# Patient Record
Sex: MALE | Race: WHITE | NOT HISPANIC OR LATINO | Employment: FULL TIME | URBAN - METROPOLITAN AREA
[De-identification: names, ages, dates, MRNs, and addresses within clinical notes are randomized per-mention and may not be internally consistent; named-entity substitution may affect disease eponyms.]

---

## 2017-01-27 ENCOUNTER — ALLSCRIPTS OFFICE VISIT (OUTPATIENT)
Dept: OTHER | Facility: OTHER | Age: 47
End: 2017-01-27

## 2017-08-08 ENCOUNTER — ALLSCRIPTS OFFICE VISIT (OUTPATIENT)
Dept: OTHER | Facility: OTHER | Age: 47
End: 2017-08-08

## 2017-12-19 ENCOUNTER — ALLSCRIPTS OFFICE VISIT (OUTPATIENT)
Dept: OTHER | Facility: OTHER | Age: 47
End: 2017-12-19

## 2018-01-14 NOTE — PSYCH
Psych Med Mgmt    Appearance: was calm and cooperative, adequate hygiene and grooming and good eye contact  Observed mood: anxious  Observed mood: affect appropriate  Speech: a normal rate and fluent  Thought processes: coherent/organized  Hallucinations: no hallucinations present  Thought Content: no delusions  Abnormal Thoughts: The patient has no suicidal thoughts and no homicidal thoughts  Orientation: The patient is oriented to person, place and time, oriented to person, oriented to place and oriented to time  Recent and Remote Memory: short term memory intact and long term memory intact  Attention Span And Concentration: concentration intact  Insight: Limited insight  Judgment: His judgment was limited  Muscle Strength And Tone  Muscle strength and tone were normal    The patient is experiencing no localized pain  Goals addressed in session: Medication management       Treatment Recommendations: Patient feels Sertraline has been causing weight gain and he is s/p bariatric surgery  Risks, Benefits And Possible Side Effects Of Medications: Risks, benefits, and possible side effects of medications explained to patient and patient verbalizes understanding  Discussed with patient Black Box warning on concurrent use of benzodiazepines and opioid medications including sedation, respiratory depression, coma and death  Patient understands the risk of treatment with benzodiazepines in addition to opioids and wants to continue taking those medications  He reports increased appetite, normal energy level, recent 25 lbs weight gain  and normal number of sleep hours  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  He stated he feels fine except for his concerns on weight gain that did not improved by lowering the dose of sertraline  Will try venlafaxine and cross taper  Assessment    1   Severe episode of recurrent major depressive disorder, without psychotic features   (296 33) (F33 2)   2  EVITA (generalized anxiety disorder) (300 02) (F41 1)    Plan    1  Sertraline HCl - 100 MG Oral Tablet (Zoloft); TAKE 1 TABLET DAILY    2  Venlafaxine HCl - 75 MG Oral Tablet; take 2 tablet twice daily   3  Venlafaxine HCl - 75 MG Oral Tablet; TAKE 1 TABLET TWICE DAILY    Review of Systems    Constitutional: as noted in HPI  Substance Abuse Hx    Substance Abuse History: Denies  Active Problems    1  Depression (311) (F32 9)   2  EVITA (generalized anxiety disorder) (300 02) (F41 1)   3  Severe episode of recurrent major depressive disorder, without psychotic features   (296 33) (F33 2)    Past Medical History    The active problems and past medical history were reviewed and updated today  Allergies    1  No Known Drug Allergies    Current Meds   1  Sertraline HCl - 100 MG Oral Tablet; TAKE 1 TABLET DAILY; Therapy: [id number] to [phone]  Requested for: 32KPZ6435; Last   Rx:[id number] Ordered    The medication list was reviewed and updated today  Family Psych History    The family history was reviewed and updated today  Social History    · Never A Smoker  The social history was reviewed and updated today  see HPI      End of Encounter Meds    1  Sertraline HCl - 100 MG Oral Tablet (Zoloft); TAKE 1 TABLET DAILY; Therapy: 68DPF7934 to (Ken Sanchez)  Requested for: 86Ulo2737; Last   Rx:[id number] Ordered    2  Venlafaxine HCl - 75 MG Oral Tablet; TAKE 1 TABLET TWICE DAILY; Therapy: 22Cts1670 to (Evaluate:36Ydu5363)  Requested for: 23Ama5231; Last   Rx:[id number] Ordered   3  Venlafaxine HCl - 75 MG Oral Tablet; take 2 tablet twice daily;    Therapy: 74Qbf0708 to (Evaluate:76Bvg7743)  Requested for: 70Ztl1997; Last   Rx:[id number]; Status: ACTIVE - Transmit to [facility] Verification Ordered    Signatures   Electronically signed by : JOSE L Anthony ; Aug  8 2017  4:29PM EST                       (Author)

## 2018-01-16 NOTE — PSYCH
Psych Med Mgmt    Appearance: was calm and cooperative, adequate hygiene and grooming and good eye contact  Observed mood: anxious  Observed mood: affect appropriate  Speech: a normal rate and fluent  Thought processes: coherent/organized  Hallucinations: no hallucinations present  Thought Content: no delusions  Abnormal Thoughts: The patient has no suicidal thoughts and no homicidal thoughts  Orientation: The patient is oriented to person, place and time, oriented to person, oriented to place and oriented to time  Recent and Remote Memory: short term memory intact and long term memory intact  Attention Span And Concentration: concentration impaired  Insight: Limited insight  Judgment: His judgment was limited  Muscle Strength And Tone  Muscle strength and tone were normal         Goals addressed in session: Medication Management       Treatment Recommendations: Patient stated he lost health insurance and he had to stretch his medication supplies and he reduced the dose to 100 mg and he stated he noticed it was better for him because he feels less activated  Risks, Benefits And Possible Side Effects Of Medications: Risks, benefits, and possible side effects of medications explained to patient and patient verbalizes understanding  He reports normal appetite, normal energy level, no weight change and normal number of sleep hours  Patient lost insurance and took his medication dose down for a while and then eventually stopped taking his medication  He stated he wants to resume the Sertraline at a lower dose  He stated he is close to graduating  He denies recent health changes   No new medications  Assessment    1  EVITA (generalized anxiety disorder) (300 02) (F41 1)   2  Severe episode of recurrent major depressive disorder, without psychotic features   (296 33) (F33 2)    Plan    1   From  Sertraline HCl - 100 MG Oral Tablet TAKE 2 TABLETS DAILY To   Sertraline HCl - 100 MG Oral Tablet (Zoloft) TAKE 1 TABLET DAILY    Review of Systems    Constitutional: as noted in HPI  Substance Abuse Hx    Substance Abuse History: Denies  Active Problems    1  Depression (311) (F32 9)   2  EVITA (generalized anxiety disorder) (300 02) (F41 1)   3  Severe episode of recurrent major depressive disorder, without psychotic features   (296 33) (F33 2)    Past Medical History    The active problems and past medical history were reviewed and updated today  Allergies    1  No Known Drug Allergies    Current Meds   1  Sertraline HCl - 100 MG Oral Tablet; TAKE 2 TABLETS DAILY; Therapy: 61WDC8195 to (Rommel Kay)  Requested for: 67GYG3286; Last   Rx:[id number] Ordered    The medication list was reviewed and updated today  Family Psych History    The family history was reviewed and updated today  Social History    · Never A Smoker  The social history was reviewed and updated today  See HPI      End of Encounter Meds    1  Sertraline HCl - 100 MG Oral Tablet (Zoloft); TAKE 1 TABLET DAILY;    Therapy: 87VRF0723 to (Whit Novoa)  Requested for: 05JYV1620; Last   LM:[id number] Ordered    Signatures   Electronically signed by : JOSE L Duarte ; Jan 27 2017  2:56PM EST                       (Author)

## 2018-01-23 NOTE — PSYCH
Psych Med Mgmt    Appearance: was calm and cooperative, adequate hygiene and grooming and good eye contact  Observed mood: mood appropriate  Observed mood: affect appropriate  Speech: a normal rate and fluent  Thought processes: coherent/organized  Hallucinations: no hallucinations present  Thought Content: no delusions  Abnormal Thoughts: The patient has no suicidal thoughts and no homicidal thoughts  Orientation: The patient is oriented to person, place and time, oriented to person, oriented to place and oriented to time  Recent and Remote Memory: short term memory intact and long term memory intact  Attention Span And Concentration: concentration intact  Insight: Limited insight  Judgment: His judgment was limited  Muscle Strength And Tone  Muscle strength and tone were normal         Goals addressed in session: Medication Management       Treatment Recommendations: Continue current treatment  Risks, Benefits And Possible Side Effects Of Medications: Risks, benefits, and possible side effects of medications explained to patient and patient verbalizes understanding  He reports normal appetite, normal energy level, no weight change and normal number of sleep hours  Mood has been less depressed and less anxious  he stated that 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 in nursing and 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 a nurse practitioner program  he stated that he had a small set back when he experienced a medication error  He has been more anxious at work after this  He stated he remains compliant with Sertraline and he was able to lose weight by adding more physical activity and he longer feels that Sertraline was causing it  MDD stable, no active symptoms of depression  EVITA: Increased due to starting new job  Assessment    1  EVITA (generalized anxiety disorder) (300 02) (F41 1)   2   Severe episode of recurrent major depressive disorder, without psychotic features   (296 33) (F33 2)    Plan    1  Sertraline HCl - 100 MG Oral Tablet (Zoloft); TAKE 1 TABLET DAILY    Review of Systems    Constitutional: No fever, no chills, feels well, no tiredness, no recent weight gain or loss  Cardiovascular: no complaints of slow or fast heart rate, no chest pain, no palpitations  Respiratory: no complaints of shortness of breath, no wheezing, no dyspnea on exertion  Gastrointestinal: no complaints of abdominal pain, no constipation, no nausea, no diarrhea, no vomiting  Genitourinary: no complaints of dysuria, no incontinence, no pelvic pain, no urinary frequency  Musculoskeletal: no complaints of arthralgia, no myalgias, no limb pain, no joint stiffness  Integumentary: no complaints of skin rash, no itching, no dry skin  Neurological: no complaints of headache, no confusion, no numbness, no dizziness  Substance Abuse Hx    Substance Abuse History: Denies  Active Problems    1  Depression (311) (F32 9)   2  EVITA (generalized anxiety disorder) (300 02) (F41 1)   3  Severe episode of recurrent major depressive disorder, without psychotic features   (296 33) (F33 2)    Past Medical History    The active problems and past medical history were reviewed and updated today  Allergies    1  No Known Drug Allergies    Current Meds   1  Sertraline HCl - 100 MG Oral Tablet; TAKE 1 TABLET DAILY; Therapy: 52DBQ3550 to (Evaluate:01Rfb2188)  Requested for: 25EQT7634; Last   Rx:[id number] Ordered    The medication list was reviewed and updated today  Family Psych History    The family history was reviewed and updated today  Social History    · Never A Smoker  The social history was reviewed and updated today  The social history was reviewed and is unchanged  End of Encounter Meds    1  Sertraline HCl - 100 MG Oral Tablet (Zoloft); TAKE 1 TABLET DAILY;    Therapy: 12EFF3028 to (Evaluate:94Ihe3476)  Requested for: 97ONJ0115; Last   Rx:[id number] Ordered    Signatures   Electronically signed by : JOSE L Quintana ; Dec 19 2017  9:48AM EST                       (Author)

## 2018-04-10 ENCOUNTER — TELEPHONE (OUTPATIENT)
Dept: PSYCHIATRY | Facility: CLINIC | Age: 48
End: 2018-04-10

## 2018-04-10 DIAGNOSIS — F33.2 MDD (MAJOR DEPRESSIVE DISORDER), RECURRENT SEVERE, WITHOUT PSYCHOSIS (HCC): Primary | ICD-10-CM

## 2018-04-10 RX ORDER — SERTRALINE HYDROCHLORIDE 100 MG/1
1 TABLET, FILM COATED ORAL DAILY
COMMUNITY
Start: 2013-03-19 | End: 2018-04-10 | Stop reason: SDUPTHER

## 2018-04-10 RX ORDER — SERTRALINE HYDROCHLORIDE 100 MG/1
100 TABLET, FILM COATED ORAL DAILY
Qty: 30 TABLET | Refills: 2 | Status: SHIPPED | OUTPATIENT
Start: 2018-04-10 | End: 2018-09-20 | Stop reason: SDUPTHER

## 2018-07-24 ENCOUNTER — DOCUMENTATION (OUTPATIENT)
Dept: PSYCHIATRY | Facility: CLINIC | Age: 48
End: 2018-07-24

## 2018-07-24 NOTE — PROGRESS NOTES
Treatment Plan not completed within required time limits due to: no show appointment on 7/18/2018      Also NS 9/28/2017  No Fu appointment scheduled at this time

## 2018-09-20 DIAGNOSIS — F33.2 MDD (MAJOR DEPRESSIVE DISORDER), RECURRENT SEVERE, WITHOUT PSYCHOSIS (HCC): ICD-10-CM

## 2018-09-20 RX ORDER — SERTRALINE HYDROCHLORIDE 100 MG/1
100 TABLET, FILM COATED ORAL DAILY
Qty: 30 TABLET | Refills: 2 | Status: SHIPPED | OUTPATIENT
Start: 2018-09-20

## 2020-03-03 ENCOUNTER — HOSPITAL ENCOUNTER (EMERGENCY)
Facility: HOSPITAL | Age: 50
Discharge: HOME | End: 2020-03-03
Attending: EMERGENCY MEDICINE | Admitting: EMERGENCY MEDICINE
Payer: COMMERCIAL

## 2020-03-03 VITALS
OXYGEN SATURATION: 98 % | WEIGHT: 184 LBS | RESPIRATION RATE: 18 BRPM | BODY MASS INDEX: 29.57 KG/M2 | HEART RATE: 76 BPM | TEMPERATURE: 98.8 F | SYSTOLIC BLOOD PRESSURE: 135 MMHG | HEIGHT: 66 IN | DIASTOLIC BLOOD PRESSURE: 86 MMHG

## 2020-03-03 DIAGNOSIS — B34.9 VIRAL ILLNESS: Primary | ICD-10-CM

## 2020-03-03 LAB
B PARAP IS1001 DNA NPH QL NAA+NON-PROBE: NOT DETECTED
B PERT.PT PRMT NPH QL NAA+NON-PROBE: NOT DETECTED
C PNEUM DNA NPH QL NAA+NON-PROBE: NOT DETECTED
FLUAV RNA NPH QL NAA+NON-PROBE: NOT DETECTED
FLUAV RNA NPH QL NAA+PROBE: NORMAL
FLUBV RNA NPH QL NAA+NON-PROBE: NOT DETECTED
FLUBV RNA NPH QL NAA+PROBE: NORMAL
HADV DNA NPH QL NAA+NON-PROBE: NOT DETECTED
HMPV RNA NPH QL NAA+NON-PROBE: NOT DETECTED
HPIV 1+2+3+4 RNA NPH QL NAA+NON-PROBE: NOT DETECTED
HPIV 1+2+3+4 RNA NPH QL NAA+NON-PROBE: NOT DETECTED
M PNEUMO DNA NPH QL NAA+NON-PROBE: NOT DETECTED
RSV RNA NPH QL NAA+NON-PROBE: NOT DETECTED
RSV RNA NPH QL NAA+PROBE: NORMAL
RV+EV RNA NPH QL NAA+NON-PROBE: NOT DETECTED

## 2020-03-03 PROCEDURE — 87486 CHLMYD PNEUM DNA AMP PROBE: CPT | Performed by: PHYSICIAN ASSISTANT

## 2020-03-03 PROCEDURE — 87798 DETECT AGENT NOS DNA AMP: CPT | Performed by: PHYSICIAN ASSISTANT

## 2020-03-03 PROCEDURE — 87633 RESP VIRUS 12-25 TARGETS: CPT | Performed by: PHYSICIAN ASSISTANT

## 2020-03-03 PROCEDURE — 99282 EMERGENCY DEPT VISIT SF MDM: CPT | Performed by: PHYSICIAN ASSISTANT

## 2020-03-03 PROCEDURE — 87581 M.PNEUMON DNA AMP PROBE: CPT | Performed by: PHYSICIAN ASSISTANT

## 2020-03-03 PROCEDURE — 99283 EMERGENCY DEPT VISIT LOW MDM: CPT

## 2020-03-03 NOTE — ED PROVIDER NOTES
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 [facility] [facility] [employer] in the [facility] [address] on January 31st   He states that 6 days ago he drove to the airport  He slept in his car prior to his flight - when he awoke in his car he began to notice runny nose and sinus congestion symptoms  He then proceeded to get on back to back flights to [place]  He states during that time his congestion and runny nose were getting more severe  He states 2 days ago he developed cough  He denies every having a fever  He denies headache, denies shortness of breath  Denies nausea or vomiting  Denies sore throat  Denies myalgias or arthralgias  Quality is reported as sinus congestion with runny nose  Severity is reported as mild to moderate  Modifiers:  Nothing has been tried for treatment of symptoms  Context: patient reports he is concerned about possible Coronavirus given his travel to [place] twice over the past 6 days in addition to his URI symptoms  He also reports that his sister is at home with a liver transplant and his elderly father lives at home and is also immunocompromised hence he is concerned because he cannot quarantine himself from them  He did receive the flu shot this year per his report  Reviewed past visits via [facility], no prior visits to this ed         History provided by:  Patient   used: No    Flu Symptoms   Presenting symptoms: cough and rhinorrhea    Presenting symptoms: no diarrhea, no fatigue, no fever, no headaches, no myalgias, no nausea, no shortness of breath, no sore throat and no vomiting    Associated symptoms: nasal congestion    Associated symptoms: no chills, no ear pain and no neck stiffness        Prior to Admission Medications   Prescriptions Last Dose Informant Patient Reported? Taking?   sertraline (ZOLOFT) 100 mg tablet   No No   Sig: TAKE 1 TABLET (100 MG TOTAL) BY MOUTH DAILY      Facility-Administered Medications: None       History reviewed  No pertinent past medical history  History reviewed  No pertinent surgical history  History reviewed  No pertinent family history  I have reviewed and agree with the history as documented  E-Cigarette/Vaping     E-Cigarette/Vaping Substances     Social History     Tobacco Use    Smoking status: Never Smoker    Smokeless tobacco: Never Used   Substance Use Topics    Alcohol use: Not Currently     Frequency: Never    Drug use: Never       Review of Systems   Constitutional: Negative for activity change, chills, diaphoresis, fatigue, fever and unexpected weight change  HENT: Positive for congestion, postnasal drip and rhinorrhea  Negative for dental problem, drooling, ear discharge, ear pain, facial swelling, hearing loss, mouth sores, nosebleeds, sinus pressure, sinus pain, sneezing, sore throat, tinnitus, trouble swallowing and voice change  Eyes: Negative for photophobia, pain, discharge, redness, itching and visual disturbance  Respiratory: Positive for cough  Negative for apnea, choking, chest tightness, shortness of breath, wheezing and stridor  Cardiovascular: Negative for chest pain, palpitations and leg swelling  Gastrointestinal: Negative for abdominal distention, abdominal pain, anal bleeding, blood in stool, constipation, diarrhea, nausea and vomiting  Endocrine: Negative for cold intolerance, heat intolerance, polydipsia, polyphagia and polyuria  Genitourinary: Negative for decreased urine volume, difficulty urinating, dysuria, flank pain, hematuria and urgency  Musculoskeletal: Negative for arthralgias, back pain, gait problem, joint swelling, myalgias, neck pain and neck stiffness  Skin: Negative for color change, pallor, rash and wound  Allergic/Immunologic: Negative for environmental allergies, food allergies and immunocompromised state  Neurological: Negative for dizziness, tremors, seizures, syncope, facial asymmetry, speech difficulty, weakness, light-headedness, numbness and headaches  Hematological: Negative for adenopathy  Does not bruise/bleed easily  Psychiatric/Behavioral: Negative for agitation, behavioral problems and confusion  The patient is not nervous/anxious  All other systems reviewed and are negative  Physical Exam  Physical Exam   Constitutional: He is oriented to person, place, and time  He appears well-developed and well-nourished  No distress  BP (!) 177/100 (BP Location: Right arm)   Pulse 76   Temp 98 5 °F (36 9 °C) (Oral)   Resp 18   Ht 5' 6" (1 676 m)   Wt 83 5 kg (184 lb)   SpO2 100%   BMI 29 70 kg/m²      HENT:   Head: Normocephalic and atraumatic  Mouth/Throat: No oropharyngeal exudate  There is yellow mucous drainage noted to the posterior pharynx  Uvula is midline without deviation  Tonsils without edema or purulent exudate  Nasal turbinates are injected and edematous bilaterally with mucous nasal drainage noted  Mucus membranes are moist and pink  Eyes: Pupils are equal, round, and reactive to light  Conjunctivae and EOM are normal  Right eye exhibits no discharge  Left eye exhibits no discharge  No scleral icterus  Neck: Normal range of motion  Neck supple  No JVD present  No tracheal deviation present  Cardiovascular: Normal rate and regular rhythm  Pulmonary/Chest: Effort normal  No stridor  No respiratory distress  He has no wheezes  He exhibits no tenderness  Breath sounds present bilaterally on auscultation to all lung fields  Scattered rhonchi bilaterally to the upper lung fields    No retractions or accessory muscle use  No wheezing  Abdominal: Soft  Bowel sounds are normal  He exhibits no distension and no mass  There is no tenderness  There is no rebound and no guarding  Musculoskeletal: Normal range of motion  He exhibits no edema, tenderness or deformity  Lymphadenopathy:     He has no cervical adenopathy  Neurological: He is alert and oriented to person, place, and time  He has normal reflexes  No cranial nerve deficit  He exhibits normal muscle tone  Coordination normal    Skin: Skin is warm and dry  Capillary refill takes less than 2 seconds  No rash noted  He is not diaphoretic  No erythema  No pallor  Psychiatric: He has a normal mood and affect  His behavior is normal  Judgment and thought content normal    Nursing note and vitals reviewed  Vital Signs  ED Triage Vitals [03/03/20 0914]   Temperature Pulse Respirations Blood Pressure SpO2   98 5 °F (36 9 °C) 76 18 (!) 177/100 100 %      Temp Source Heart Rate Source Patient Position - Orthostatic VS BP Location FiO2 (%)   Oral Monitor Lying Right arm --      Pain Score       5           Vitals:    03/03/20 0914 03/03/20 1158   BP: (!) 177/100 135/86   Pulse: 76 76   Patient Position - Orthostatic VS: Lying Lying         Visual Acuity      ED Medications  Medications - No data to display    Diagnostic Studies  Results Reviewed     Procedure Component Value Units Date/Time    Respiratory Panel 2 (RP2) [[id number]] Collected:  03/03/20 1014    Lab Status:   In process Specimen:  Nasopharyngeal Swab Updated:  03/03/20 1121    Influenza A/B and RSV PCR [[id number]]  (Normal) Collected:  03/03/20 1009    Lab Status:  Final result Specimen:  Nose Updated:  03/03/20 1056     INFLUENZA A PCR None Detected     INFLUENZA B PCR None Detected     RSV PCR None Detected                 No orders to display              Procedures  Procedures         ED Course  ED Course as of Mar 03 1528   Tue Mar 03, 2020   1146 Lab results reviewed -- flu test negative, rsv test negative  MDM  Number of Diagnoses or Management Options  Viral illness: new and requires workup  Diagnosis management comments: ddx includes but is not limited to:  URI, RSV, sinusitis, OE, OM, serous otitis media,  flu, allergies, viral syndrome, asthma, bronchitis, pneumonia, consider but doubt interstitial lung disease, pertussis  Patient placed in isolation  Examined wearing N95 mask/plus surgical mask with eyeguard in addition to gown and gloves  Lab results reviewed  Flu test negative  rsv test negative  Viral panel pending  Discussed test results with patient at bedside  Discussed with patient he does not meet CDC criteria for coronavirus testing today - no documented fever  Patient insisted that he be tested for coronavirus and requested to speak with Infectious Disease regarding his case  I discussed with ID and then returned to patient and explained that he still does not meet criteria for coronavirus testing per CDC  At that time patient reported that he had been having "low grade fevers" of 98 8 degrees F at home  I discussed with him this does not represent a fever and does not qualify him for testing  I discussed with him the pending viral panel  Discussed with him treatment of symptoms including supportive care, frequent handwashing, self-isolation from those who are immunocompromise or may be at increased risk of infection  I discussed the patient use of Tylenol or ibuprofen over-the-counter should he develop fevers  Discussed reasons to return to the emergency department  The patient did discuss with me that 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 and that he deserved to have infectious disease see him in the ED and discuss his case with him  He stated that he will be sure to reflect this displeasure in his HCAP survey  Reviewed reasons to return to ed    Patient verbalized understanding of diagnosis and agreement with discharge plan of care as well as understanding of reasons to return to ed  Amount and/or Complexity of Data Reviewed  Clinical lab tests: ordered and reviewed  Discussion of test results with the performing providers: yes  Review and summarize past medical records: yes    Patient Progress  Patient progress: stable        Disposition  Final diagnoses:   Viral illness     Time reflects when diagnosis was documented in both MDM as applicable and the Disposition within this note     Time User Action Codes Description Comment    3/3/2020 12:22 PM Davon Pandey Add [B34 9] Viral illness       ED Disposition     ED Disposition Condition Date/Time Comment    Discharge Stable Tue Mar 3, 2020 12:22 PM Perla Stevenson discharge to home/self care  Follow-up Information     Follow up With Specialties Details Why Contact Info Additional Information    Liat Frye MD  Call in 2 days for further evaluation of symptoms [address] [facility] [phone]       [facility] Emergency Department Emergency Medicine Go to  If symptoms worsen [address]  [phone] [place] ED, [address] [place], [place], [address],  Infectious Diseases Call  for further evaluation of symptoms, If symptoms worsen [address]  [phone]             Discharge Medication List as of 3/3/2020 12:23 PM      CONTINUE these medications which have NOT CHANGED    Details   sertraline (ZOLOFT) 100 mg tablet TAKE 1 TABLET (100 MG TOTAL) BY MOUTH DAILY, Starting Thu 9/20/2018, Normal           No discharge procedures on file      PDMP Review     None          ED Provider  Electronically Signed by           Daylin Alcantar PA-C  03/03/20 7262

## 2020-03-03 NOTE — ED NOTES
Attempted to discharge patient  Patient concerned of infecting others in the community, concerned for infecting family at home  Upset that ID Physician did not come see patient  Provider notified, and consult placed for ID        Manuel Cleveland RN  03/03/20 4945

## 2020-03-03 NOTE — ED NOTES
Primary RN spoke with patient that ID physician will be down to see him for an official consult and to help with his concerns  Patient stated "I just want to be discharged and go home  I am not paying for a consult if nothing is going to change" Provider notified as well as ID physician        Peace Richard RN  03/03/20 0449

## 2020-07-27 ENCOUNTER — TRANSCRIBE ORDERS (OUTPATIENT)
Dept: LAB | Facility: CLINIC | Age: 50
End: 2020-07-27

## 2020-07-27 ENCOUNTER — APPOINTMENT (OUTPATIENT)
Dept: LAB | Facility: CLINIC | Age: 50
End: 2020-07-27

## 2020-07-27 DIAGNOSIS — Z02.1 PRE-EMPLOYMENT HEALTH SCREENING EXAMINATION: ICD-10-CM

## 2020-07-27 DIAGNOSIS — Z02.1 PRE-EMPLOYMENT HEALTH SCREENING EXAMINATION: Primary | ICD-10-CM

## 2020-07-27 PROCEDURE — 36415 COLL VENOUS BLD VENIPUNCTURE: CPT

## 2020-07-27 PROCEDURE — 86480 TB TEST CELL IMMUN MEASURE: CPT

## 2020-07-29 LAB
GAMMA INTERFERON BACKGROUND BLD IA-ACNC: 0.06 IU/ML
M TB IFN-G BLD-IMP: NEGATIVE
M TB IFN-G CD4+ BCKGRND COR BLD-ACNC: -0.01 IU/ML
M TB IFN-G CD4+ BCKGRND COR BLD-ACNC: 0.02 IU/ML
MITOGEN IGNF BCKGRD COR BLD-ACNC: >10 IU/ML

## 2025-06-03 ENCOUNTER — APPOINTMENT (OUTPATIENT)
Dept: LAB | Facility: CLINIC | Age: 55
End: 2025-06-03
Payer: COMMERCIAL

## 2025-06-03 DIAGNOSIS — E55.9 AVITAMINOSIS D: ICD-10-CM

## 2025-06-03 DIAGNOSIS — M81.0 SENILE OSTEOPOROSIS: ICD-10-CM

## 2025-06-03 LAB
25(OH)D3 SERPL-MCNC: 60 NG/ML (ref 30–100)
ALBUMIN SERPL BCG-MCNC: 4.2 G/DL (ref 3.5–5)
ALP SERPL-CCNC: 50 U/L (ref 34–104)
ALT SERPL W P-5'-P-CCNC: 13 U/L (ref 7–52)
ANION GAP SERPL CALCULATED.3IONS-SCNC: 7 MMOL/L (ref 4–13)
AST SERPL W P-5'-P-CCNC: 18 U/L (ref 13–39)
BILIRUB SERPL-MCNC: 0.51 MG/DL (ref 0.2–1)
BUN SERPL-MCNC: 14 MG/DL (ref 5–25)
CALCIUM SERPL-MCNC: 9.1 MG/DL (ref 8.4–10.2)
CHLORIDE SERPL-SCNC: 104 MMOL/L (ref 96–108)
CO2 SERPL-SCNC: 30 MMOL/L (ref 21–32)
CREAT SERPL-MCNC: 0.89 MG/DL (ref 0.6–1.3)
GFR SERPL CREATININE-BSD FRML MDRD: 98 ML/MIN/1.73SQ M
GLUCOSE P FAST SERPL-MCNC: 87 MG/DL (ref 65–99)
PHOSPHATE SERPL-MCNC: 2.9 MG/DL (ref 2.7–4.5)
POTASSIUM SERPL-SCNC: 4.1 MMOL/L (ref 3.5–5.3)
PROT SERPL-MCNC: 6.7 G/DL (ref 6.4–8.4)
PTH-INTACT SERPL-MCNC: 142.3 PG/ML (ref 12–88)
SODIUM SERPL-SCNC: 141 MMOL/L (ref 135–147)
TESTOST SERPL-MSCNC: 402 NG/DL (ref 175–781)

## 2025-06-03 PROCEDURE — 80053 COMPREHEN METABOLIC PANEL: CPT

## 2025-06-03 PROCEDURE — 84100 ASSAY OF PHOSPHORUS: CPT

## 2025-06-03 PROCEDURE — 83970 ASSAY OF PARATHORMONE: CPT

## 2025-06-03 PROCEDURE — 36415 COLL VENOUS BLD VENIPUNCTURE: CPT

## 2025-06-03 PROCEDURE — 84403 ASSAY OF TOTAL TESTOSTERONE: CPT

## 2025-06-03 PROCEDURE — 82306 VITAMIN D 25 HYDROXY: CPT
